# Patient Record
Sex: FEMALE | Race: WHITE | NOT HISPANIC OR LATINO | Employment: FULL TIME | URBAN - NONMETROPOLITAN AREA
[De-identification: names, ages, dates, MRNs, and addresses within clinical notes are randomized per-mention and may not be internally consistent; named-entity substitution may affect disease eponyms.]

---

## 2023-07-07 ENCOUNTER — OFFICE VISIT (OUTPATIENT)
Dept: PRIMARY CARE | Facility: CLINIC | Age: 53
End: 2023-07-07
Payer: COMMERCIAL

## 2023-07-07 VITALS
BODY MASS INDEX: 41.77 KG/M2 | WEIGHT: 227 LBS | HEIGHT: 62 IN | DIASTOLIC BLOOD PRESSURE: 64 MMHG | HEART RATE: 84 BPM | TEMPERATURE: 97.8 F | SYSTOLIC BLOOD PRESSURE: 124 MMHG | OXYGEN SATURATION: 98 %

## 2023-07-07 DIAGNOSIS — R73.9 HYPERGLYCEMIA: ICD-10-CM

## 2023-07-07 DIAGNOSIS — R09.A2 GLOBUS SENSATION: ICD-10-CM

## 2023-07-07 DIAGNOSIS — R63.5 EXCESSIVE WEIGHT GAIN: Primary | ICD-10-CM

## 2023-07-07 LAB
ALANINE AMINOTRANSFERASE (SGPT) (U/L) IN SER/PLAS: 13 U/L (ref 7–45)
ALBUMIN (G/DL) IN SER/PLAS: 4.2 G/DL (ref 3.4–5)
ALKALINE PHOSPHATASE (U/L) IN SER/PLAS: 72 U/L (ref 33–110)
ANION GAP IN SER/PLAS: 15 MMOL/L (ref 10–20)
ASPARTATE AMINOTRANSFERASE (SGOT) (U/L) IN SER/PLAS: 16 U/L (ref 9–39)
BASOPHILS (10*3/UL) IN BLOOD BY AUTOMATED COUNT: 0.06 X10E9/L (ref 0–0.1)
BASOPHILS/100 LEUKOCYTES IN BLOOD BY AUTOMATED COUNT: 0.9 % (ref 0–2)
BILIRUBIN TOTAL (MG/DL) IN SER/PLAS: 0.4 MG/DL (ref 0–1.2)
CALCIUM (MG/DL) IN SER/PLAS: 9.1 MG/DL (ref 8.6–10.3)
CARBON DIOXIDE, TOTAL (MMOL/L) IN SER/PLAS: 29 MMOL/L (ref 21–32)
CHLORIDE (MMOL/L) IN SER/PLAS: 100 MMOL/L (ref 98–107)
CREATININE (MG/DL) IN SER/PLAS: 0.72 MG/DL (ref 0.5–1.05)
EOSINOPHILS (10*3/UL) IN BLOOD BY AUTOMATED COUNT: 0.29 X10E9/L (ref 0–0.7)
EOSINOPHILS/100 LEUKOCYTES IN BLOOD BY AUTOMATED COUNT: 4.2 % (ref 0–6)
ERYTHROCYTE DISTRIBUTION WIDTH (RATIO) BY AUTOMATED COUNT: 13 % (ref 11.5–14.5)
ERYTHROCYTE MEAN CORPUSCULAR HEMOGLOBIN CONCENTRATION (G/DL) BY AUTOMATED: 29.9 G/DL (ref 32–36)
ERYTHROCYTE MEAN CORPUSCULAR VOLUME (FL) BY AUTOMATED COUNT: 98 FL (ref 80–100)
ERYTHROCYTES (10*6/UL) IN BLOOD BY AUTOMATED COUNT: 4.81 X10E12/L (ref 4–5.2)
GFR FEMALE: >90 ML/MIN/1.73M2
GLUCOSE (MG/DL) IN SER/PLAS: 85 MG/DL (ref 74–99)
HEMATOCRIT (%) IN BLOOD BY AUTOMATED COUNT: 47.2 % (ref 36–46)
HEMOGLOBIN (G/DL) IN BLOOD: 14.1 G/DL (ref 12–16)
IMMATURE GRANULOCYTES/100 LEUKOCYTES IN BLOOD BY AUTOMATED COUNT: 0.4 % (ref 0–0.9)
LEUKOCYTES (10*3/UL) IN BLOOD BY AUTOMATED COUNT: 6.8 X10E9/L (ref 4.4–11.3)
LYMPHOCYTES (10*3/UL) IN BLOOD BY AUTOMATED COUNT: 1.75 X10E9/L (ref 1.2–4.8)
LYMPHOCYTES/100 LEUKOCYTES IN BLOOD BY AUTOMATED COUNT: 25.6 % (ref 13–44)
MONOCYTES (10*3/UL) IN BLOOD BY AUTOMATED COUNT: 0.46 X10E9/L (ref 0.1–1)
MONOCYTES/100 LEUKOCYTES IN BLOOD BY AUTOMATED COUNT: 6.7 % (ref 2–10)
NEUTROPHILS (10*3/UL) IN BLOOD BY AUTOMATED COUNT: 4.24 X10E9/L (ref 1.2–7.7)
NEUTROPHILS/100 LEUKOCYTES IN BLOOD BY AUTOMATED COUNT: 62.2 % (ref 40–80)
PLATELETS (10*3/UL) IN BLOOD AUTOMATED COUNT: 331 X10E9/L (ref 150–450)
POTASSIUM (MMOL/L) IN SER/PLAS: 4.9 MMOL/L (ref 3.5–5.3)
PROTEIN TOTAL: 7.2 G/DL (ref 6.4–8.2)
SODIUM (MMOL/L) IN SER/PLAS: 139 MMOL/L (ref 136–145)
UREA NITROGEN (MG/DL) IN SER/PLAS: 15 MG/DL (ref 6–23)

## 2023-07-07 PROCEDURE — 99204 OFFICE O/P NEW MOD 45 MIN: CPT | Performed by: FAMILY MEDICINE

## 2023-07-07 PROCEDURE — 86376 MICROSOMAL ANTIBODY EACH: CPT

## 2023-07-07 PROCEDURE — 84481 FREE ASSAY (FT-3): CPT

## 2023-07-07 PROCEDURE — 85025 COMPLETE CBC W/AUTO DIFF WBC: CPT

## 2023-07-07 PROCEDURE — 84443 ASSAY THYROID STIM HORMONE: CPT

## 2023-07-07 PROCEDURE — 83036 HEMOGLOBIN GLYCOSYLATED A1C: CPT

## 2023-07-07 PROCEDURE — 80053 COMPREHEN METABOLIC PANEL: CPT

## 2023-07-07 PROCEDURE — 1036F TOBACCO NON-USER: CPT | Performed by: FAMILY MEDICINE

## 2023-07-07 RX ORDER — BUDESONIDE 180 UG/1
AEROSOL, POWDER RESPIRATORY (INHALATION)
COMMUNITY
Start: 2022-10-14 | End: 2023-10-16 | Stop reason: SDUPTHER

## 2023-07-07 RX ORDER — CYCLOBENZAPRINE HCL 10 MG
TABLET ORAL
COMMUNITY
Start: 2023-02-28 | End: 2024-06-03 | Stop reason: WASHOUT

## 2023-07-07 RX ORDER — ALBUTEROL SULFATE 90 UG/1
2 AEROSOL, METERED RESPIRATORY (INHALATION) EVERY 6 HOURS PRN
COMMUNITY
Start: 2023-02-28

## 2023-07-07 RX ORDER — OMEPRAZOLE 40 MG/1
CAPSULE, DELAYED RELEASE ORAL
COMMUNITY
End: 2024-03-28

## 2023-07-07 RX ORDER — IPRATROPIUM BROMIDE AND ALBUTEROL SULFATE 2.5; .5 MG/3ML; MG/3ML
SOLUTION RESPIRATORY (INHALATION)
COMMUNITY
Start: 2022-10-14

## 2023-07-07 RX ORDER — IBUPROFEN 200 MG
200 TABLET ORAL EVERY 6 HOURS PRN
COMMUNITY

## 2023-07-07 ASSESSMENT — ENCOUNTER SYMPTOMS
SLEEP DISTURBANCE: 0
ACTIVITY CHANGE: 0
ARTHRALGIAS: 0
SHORTNESS OF BREATH: 0
HEMATURIA: 0
DIARRHEA: 0
PALPITATIONS: 0
LIGHT-HEADEDNESS: 0
NAUSEA: 0
VOMITING: 0
APPETITE CHANGE: 0
SORE THROAT: 0
JOINT SWELLING: 0
NERVOUS/ANXIOUS: 0
FLANK PAIN: 0
WHEEZING: 0
FEVER: 0
EYE DISCHARGE: 0
ABDOMINAL DISTENTION: 1
EYE ITCHING: 0
NUMBNESS: 0
MYALGIAS: 0
UNEXPECTED WEIGHT CHANGE: 0
COUGH: 0
SINUS PRESSURE: 0
DYSPHORIC MOOD: 0
HEADACHES: 0
RHINORRHEA: 0
CONSTIPATION: 0
ABDOMINAL PAIN: 0
WEAKNESS: 0
BLOOD IN STOOL: 0
DYSURIA: 0
DIZZINESS: 0

## 2023-07-07 NOTE — PATIENT INSTRUCTIONS
Labs are taken and the thyroid usn is ordered   We will call with the results of the testing and further follow up

## 2023-07-07 NOTE — PROGRESS NOTES
"Subjective   Patient ID: Rivka Burgos is a 52 y.o. female who presents for Hernia (Multiple in the abdominal area according to Rivka.), Obesity (Concerned of gaining and not doing anything different. Tried Wegovy and it didn't help.  Adipex helped at one point. ), and Thyroid Problem (States at one time she was borderline.  ).    HPI REVIEWED PRIOR NOTES FROM OTHER PROVIDERS AND SEE REFERRAL IS MADE TO ENDOCRINE ?[facility]    Review of Systems   Constitutional:  Negative for activity change, appetite change, fever and unexpected weight change.   HENT:  Negative for congestion, ear pain, postnasal drip, rhinorrhea, sinus pressure and sore throat.    Eyes:  Negative for discharge, itching and visual disturbance.   Respiratory:  Negative for cough, shortness of breath and wheezing.    Cardiovascular:  Negative for chest pain, palpitations and leg swelling.   Gastrointestinal:  Positive for abdominal distention. Negative for abdominal pain, blood in stool, constipation, diarrhea, nausea and vomiting.        LARGE PROTUBERANT ABDOMEN  HAD EGD AND FOUND AN ULCER IN THE PAST   TOLD HAD ABDOMINAL HERNIAS    Endocrine: Positive for heat intolerance. Negative for cold intolerance and polyuria.        GLOBUS FEELING WHEN LIES DOWN AND FEELS LIKE CHOKING   RECENT SCOPE OK    Genitourinary:  Negative for dysuria, flank pain and hematuria.   Musculoskeletal:  Negative for arthralgias, gait problem, joint swelling and myalgias.   Skin:  Negative for rash.   Allergic/Immunologic: Negative for environmental allergies and food allergies.   Neurological:  Negative for dizziness, syncope, weakness, light-headedness, numbness and headaches.   Psychiatric/Behavioral:  Negative for dysphoric mood and sleep disturbance. The patient is not nervous/anxious.        Objective   /64   Pulse 84   Temp 36.6 °C (97.8 °F)   Ht 1.562 m (5' 1.5\")   Wt 103 kg (227 lb)   SpO2 98%   BMI 42.20 kg/m²     Physical Exam  Constitutional:       " Appearance: Normal appearance. She is obese.   HENT:      Head: Normocephalic and atraumatic.      Nose: Nose normal.      Mouth/Throat:      Mouth: Mucous membranes are moist.   Eyes:      Extraocular Movements: Extraocular movements intact.      Pupils: Pupils are equal, round, and reactive to light.   Neck:      Comments: THYROID FEEL ENLARGED AND RUBBERY   Cardiovascular:      Rate and Rhythm: Normal rate and regular rhythm.   Pulmonary:      Effort: Pulmonary effort is normal.      Breath sounds: Normal breath sounds.   Abdominal:      Palpations: Abdomen is soft.   Musculoskeletal:         General: Normal range of motion.      Cervical back: Normal range of motion and neck supple.   Skin:     General: Skin is warm and dry.   Neurological:      General: No focal deficit present.      Mental Status: She is alert and oriented to person, place, and time.   Psychiatric:         Mood and Affect: Mood normal.         Behavior: Behavior normal.         Assessment/Plan   Diagnoses and all orders for this visit:  Excessive weight gain  -     CBC and Auto Differential  -     Comprehensive Metabolic Panel  -     TSH with reflex to Free T4 if abnormal  -     Thyroid Peroxidase (TPO) Antibody  -     T3, free  -     US thyroid; Future  Globus sensation  -     CBC and Auto Differential  -     Comprehensive Metabolic Panel  -     TSH with reflex to Free T4 if abnormal  -     Thyroid Peroxidase (TPO) Antibody  -     T3, free  -     US thyroid; Future  Hyperglycemia  -     Hemoglobin A1c  Other orders  -     Follow Up In Primary Care - Established; Future

## 2023-07-08 LAB
ESTIMATED AVERAGE GLUCOSE FOR HBA1C: 111 MG/DL
HEMOGLOBIN A1C/HEMOGLOBIN TOTAL IN BLOOD: 5.5 %
THYROPEROXIDASE AB (IU/ML) IN SER/PLAS: 32 IU/ML
THYROTROPIN (MIU/L) IN SER/PLAS BY DETECTION LIMIT <= 0.05 MIU/L: 2.31 MIU/L (ref 0.44–3.98)
TRIIODOTHYRONINE (T3) FREE (PG/ML) IN SER/PLAS: 3.5 PG/ML (ref 2.3–4.2)

## 2023-07-10 ENCOUNTER — TELEPHONE (OUTPATIENT)
Dept: PRIMARY CARE | Facility: CLINIC | Age: 53
End: 2023-07-10
Payer: COMMERCIAL

## 2023-07-10 NOTE — TELEPHONE ENCOUNTER
----- Message from Bonnie Alvarez MA sent at 7/10/2023  1:29 PM EDT -----    ----- Message -----  From: Cydney Moffett DO  Sent: 7/10/2023   8:04 AM EDT  To: Bonnie Alvarez MA    USN OF HER THYROID IS PENDING  [facility] LOW PLEASE PUT IN FOR IRON AND TIBC   REST OF LABS INCLUDING HER THYROID ARE ALL NORMAL

## 2023-07-12 NOTE — TELEPHONE ENCOUNTER
Does not look like they were able to do additional labs. Do you want her to come back in and have drawn?

## 2023-07-17 ENCOUNTER — OFFICE VISIT (OUTPATIENT)
Dept: PRIMARY CARE | Facility: CLINIC | Age: 53
End: 2023-07-17
Payer: COMMERCIAL

## 2023-07-17 VITALS
DIASTOLIC BLOOD PRESSURE: 60 MMHG | WEIGHT: 227 LBS | BODY MASS INDEX: 42.2 KG/M2 | TEMPERATURE: 97.6 F | SYSTOLIC BLOOD PRESSURE: 122 MMHG | OXYGEN SATURATION: 96 % | HEART RATE: 93 BPM

## 2023-07-17 DIAGNOSIS — R63.5 EXCESSIVE WEIGHT GAIN: ICD-10-CM

## 2023-07-17 DIAGNOSIS — G47.33 OSA ON CPAP: ICD-10-CM

## 2023-07-17 DIAGNOSIS — R09.A2 GLOBUS SENSATION: Primary | ICD-10-CM

## 2023-07-17 DIAGNOSIS — F41.9 ANXIETY: ICD-10-CM

## 2023-07-17 DIAGNOSIS — Z02.89 MEDICATION MANAGEMENT CONTRACT AGREEMENT: ICD-10-CM

## 2023-07-17 PROCEDURE — 99214 OFFICE O/P EST MOD 30 MIN: CPT | Performed by: FAMILY MEDICINE

## 2023-07-17 PROCEDURE — 1036F TOBACCO NON-USER: CPT | Performed by: FAMILY MEDICINE

## 2023-07-17 PROCEDURE — 80307 DRUG TEST PRSMV CHEM ANLYZR: CPT

## 2023-07-17 RX ORDER — BUSPIRONE HYDROCHLORIDE 5 MG/1
5 TABLET ORAL NIGHTLY
Qty: 30 TABLET | Refills: 2 | Status: SHIPPED | OUTPATIENT
Start: 2023-07-17 | End: 2023-08-09

## 2023-07-17 ASSESSMENT — ENCOUNTER SYMPTOMS
NAUSEA: 0
ABDOMINAL PAIN: 0
DYSURIA: 0
RHINORRHEA: 0
PALPITATIONS: 0
LIGHT-HEADEDNESS: 0
NERVOUS/ANXIOUS: 0
FEVER: 0
COUGH: 0
DIARRHEA: 0
SINUS PRESSURE: 0
UNEXPECTED WEIGHT CHANGE: 0
SORE THROAT: 0
HEADACHES: 0
HEMATURIA: 0
EYE ITCHING: 0
CONSTIPATION: 0
JOINT SWELLING: 0
DYSPHORIC MOOD: 1
EYE DISCHARGE: 0
MYALGIAS: 0
NUMBNESS: 0
ARTHRALGIAS: 0
SLEEP DISTURBANCE: 1
WHEEZING: 0
APPETITE CHANGE: 0
DIZZINESS: 0
WEAKNESS: 0
FATIGUE: 1
BLOOD IN STOOL: 0
FLANK PAIN: 0
VOMITING: 0
SHORTNESS OF BREATH: 0
ACTIVITY CHANGE: 0

## 2023-07-17 NOTE — PATIENT INSTRUCTIONS
SEE IN 3 MONTHS CMP AND IRON AND TIBC  AND FERRITIN LEVEL  CBC   TSH AND T4     SEE IN ONE MONTH FOR THE WEIGHT LOSS PROGRAM

## 2023-07-17 NOTE — TELEPHONE ENCOUNTER
LOOKS LIKE SHE HAS CX HER FOLLOW UP  Rx: Puree and thin liquids:  meds crushed in puree: NO STRAW  Pt upright and in alignment for meals.  Stay upright 20-30 minutes after eating.   small sips of thin liquid.  feed as tolerated and only if pt is able to participate.   Disc with nsg.  Service will follow for tolerance and upgrade.

## 2023-07-17 NOTE — PROGRESS NOTES
Subjective   Patient ID: Rivka Burgos is a 52 y.o. female who presents for Follow-up.    HPI LOTS OF FAMILY STRESS     Review of Systems   Constitutional:  Positive for fatigue. Negative for activity change, appetite change, fever and unexpected weight change.   HENT:  Negative for congestion, ear pain, postnasal drip, rhinorrhea, sinus pressure and sore throat.    Eyes:  Negative for discharge, itching and visual disturbance.   Respiratory:  Negative for cough, shortness of breath and wheezing.    Cardiovascular:  Negative for chest pain, palpitations and leg swelling.   Gastrointestinal:  Negative for abdominal pain, blood in stool, constipation, diarrhea, nausea and vomiting.   Endocrine: Negative for cold intolerance, heat intolerance and polyuria.   Genitourinary:  Negative for dysuria, flank pain and hematuria.   Musculoskeletal:  Negative for arthralgias, gait problem, joint swelling and myalgias.   Skin:  Negative for rash.   Allergic/Immunologic: Negative for environmental allergies and food allergies.   Neurological:  Negative for dizziness, syncope, weakness, light-headedness, numbness and headaches.   Psychiatric/Behavioral:  Positive for dysphoric mood and sleep disturbance. The patient is not nervous/anxious.         FALLS ASLEEP BUT CAN NOT MAINTAIN SLEEP /WAKES UP WORRIED OVER DAUGHTER'S AND GRANDCHILDREN AND BOTH PARENTS WHOA RE GETTING OLDER AND WITH HEALTH PROBLEMS   SHE DOES NOT FEEL WELL BEING SO OVERWEIGHT        Objective   /60   Pulse 93   Temp 36.4 °C (97.6 °F)   Wt 103 kg (227 lb)   SpO2 96%   BMI 42.20 kg/m²     Physical Exam  Constitutional:       Appearance: Normal appearance. She is obese.   HENT:      Head: Normocephalic and atraumatic.      Nose: Nose normal.      Mouth/Throat:      Mouth: Mucous membranes are moist.   Eyes:      Extraocular Movements: Extraocular movements intact.      Pupils: Pupils are equal, round, and reactive to light.   Cardiovascular:      Rate  "and Rhythm: Normal rate and regular rhythm.   Pulmonary:      Effort: Pulmonary effort is normal.      Breath sounds: Normal breath sounds.   Abdominal:      Palpations: Abdomen is soft.   Musculoskeletal:         General: Normal range of motion.      Cervical back: Normal range of motion and neck supple.   Skin:     General: Skin is warm and dry.   Neurological:      General: No focal deficit present.      Mental Status: She is alert and oriented to person, place, and time.   Psychiatric:         Behavior: Behavior normal.      Comments: ANXIOUS MOOD AND SOME FEELINGS OF DEPRESSION OR FRUSTRATION OVER ISSUES WITH HER FAMILY  \"NO TIME TO HELP HERSELF\"         Assessment/Plan   Diagnoses and all orders for this visit:  Globus sensation  -     busPIRone (Buspar) 5 mg tablet; Take 1 tablet (5 mg) by mouth once daily at bedtime.  LARA on CPAP  Anxiety  -     busPIRone (Buspar) 5 mg tablet; Take 1 tablet (5 mg) by mouth once daily at bedtime.  Medication management contract agreement  -     Drug Screen, Urine With Reflex to Confirmation  Excessive weight gain  Other orders  -     Follow Up In Primary Care - Established         "

## 2023-07-18 ENCOUNTER — TELEPHONE (OUTPATIENT)
Dept: PRIMARY CARE | Facility: CLINIC | Age: 53
End: 2023-07-18
Payer: COMMERCIAL

## 2023-07-18 DIAGNOSIS — E66.01 CLASS 3 SEVERE OBESITY DUE TO EXCESS CALORIES WITH BODY MASS INDEX (BMI) OF 40.0 TO 44.9 IN ADULT, UNSPECIFIED WHETHER SERIOUS COMORBIDITY PRESENT (MULTI): Primary | ICD-10-CM

## 2023-07-18 PROBLEM — E66.813 CLASS 3 SEVERE OBESITY DUE TO EXCESS CALORIES WITH BODY MASS INDEX (BMI) OF 40.0 TO 44.9 IN ADULT: Status: ACTIVE | Noted: 2023-07-18

## 2023-07-18 LAB
AMPHETAMINE (PRESENCE) IN URINE BY SCREEN METHOD: NORMAL
BARBITURATES PRESENCE IN URINE BY SCREEN METHOD: NORMAL
BENZODIAZEPINE (PRESENCE) IN URINE BY SCREEN METHOD: NORMAL
CANNABINOIDS IN URINE BY SCREEN METHOD: NORMAL
COCAINE (PRESENCE) IN URINE BY SCREEN METHOD: NORMAL
DRUG SCREEN COMMENT URINE: NORMAL
FENTANYL URINE: NORMAL
METHADONE (PRESENCE) IN URINE BY SCREEN METHOD: NORMAL
OPIATES (PRESENCE) IN URINE BY SCREEN METHOD: NORMAL
OXYCODONE (PRESENCE) IN URINE BY SCREEN METHOD: NORMAL
PHENCYCLIDINE (PRESENCE) IN URINE BY SCREEN METHOD: NORMAL

## 2023-07-18 RX ORDER — PHENTERMINE HYDROCHLORIDE 37.5 MG/1
37.5 TABLET ORAL
Qty: 30 TABLET | Refills: 0 | Status: SHIPPED | OUTPATIENT
Start: 2023-07-18 | End: 2023-08-14 | Stop reason: SDUPTHER

## 2023-07-18 NOTE — TELEPHONE ENCOUNTER
----- Message from Cydney Moffett DO sent at 7/18/2023  8:52 AM EDT -----  Sent adipcheko  See one month

## 2023-07-18 NOTE — TELEPHONE ENCOUNTER
"----- Message from Cydney Moffett DO sent at 7/18/2023  8:44 AM EDT -----  HER UDS IS NEGATIVE FOR ILLICIT\"S   I WILL SEND ADIPEX  SEE IN ONE MONTH FOR WEIGHT FOLLOW UP    "

## 2023-07-18 NOTE — TELEPHONE ENCOUNTER
I left a message that her script was sent to her pharmacy.  No specific info was given on the VM.

## 2023-08-08 DIAGNOSIS — R09.A2 GLOBUS SENSATION: ICD-10-CM

## 2023-08-08 DIAGNOSIS — F41.9 ANXIETY: ICD-10-CM

## 2023-08-09 RX ORDER — BUSPIRONE HYDROCHLORIDE 5 MG/1
5 TABLET ORAL NIGHTLY
Qty: 30 TABLET | Refills: 2 | Status: SHIPPED | OUTPATIENT
Start: 2023-08-09 | End: 2023-09-11 | Stop reason: SDUPTHER

## 2023-08-14 ENCOUNTER — OFFICE VISIT (OUTPATIENT)
Dept: PRIMARY CARE | Facility: CLINIC | Age: 53
End: 2023-08-14
Payer: COMMERCIAL

## 2023-08-14 VITALS
DIASTOLIC BLOOD PRESSURE: 68 MMHG | SYSTOLIC BLOOD PRESSURE: 116 MMHG | OXYGEN SATURATION: 95 % | BODY MASS INDEX: 41.45 KG/M2 | HEART RATE: 91 BPM | TEMPERATURE: 98.5 F | WEIGHT: 223 LBS

## 2023-08-14 DIAGNOSIS — R09.A2 GLOBUS SENSATION: ICD-10-CM

## 2023-08-14 DIAGNOSIS — Z02.89 MEDICATION MANAGEMENT CONTRACT AGREEMENT: Primary | ICD-10-CM

## 2023-08-14 DIAGNOSIS — R63.5 EXCESSIVE WEIGHT GAIN: ICD-10-CM

## 2023-08-14 DIAGNOSIS — E66.01 CLASS 3 SEVERE OBESITY DUE TO EXCESS CALORIES WITH BODY MASS INDEX (BMI) OF 40.0 TO 44.9 IN ADULT, UNSPECIFIED WHETHER SERIOUS COMORBIDITY PRESENT (MULTI): ICD-10-CM

## 2023-08-14 PROCEDURE — 1036F TOBACCO NON-USER: CPT | Performed by: FAMILY MEDICINE

## 2023-08-14 PROCEDURE — 99214 OFFICE O/P EST MOD 30 MIN: CPT | Performed by: FAMILY MEDICINE

## 2023-08-14 PROCEDURE — 3008F BODY MASS INDEX DOCD: CPT | Performed by: FAMILY MEDICINE

## 2023-08-14 RX ORDER — PHENTERMINE HYDROCHLORIDE 37.5 MG/1
37.5 TABLET ORAL
Qty: 30 TABLET | Refills: 0 | Status: SHIPPED | OUTPATIENT
Start: 2023-08-14 | End: 2023-09-11 | Stop reason: SDUPTHER

## 2023-08-14 ASSESSMENT — ENCOUNTER SYMPTOMS
ABDOMINAL PAIN: 0
JOINT SWELLING: 0
FLANK PAIN: 0
EYE ITCHING: 0
DYSPHORIC MOOD: 0
WHEEZING: 0
NERVOUS/ANXIOUS: 0
SORE THROAT: 0
APPETITE CHANGE: 0
SLEEP DISTURBANCE: 0
PALPITATIONS: 0
UNEXPECTED WEIGHT CHANGE: 0
HEMATURIA: 0
CONSTIPATION: 0
SINUS PRESSURE: 0
ACTIVITY CHANGE: 0
BLOOD IN STOOL: 0
ARTHRALGIAS: 0
MYALGIAS: 0
HEADACHES: 0
LIGHT-HEADEDNESS: 0
EYE DISCHARGE: 0
VOMITING: 0
FEVER: 0
SHORTNESS OF BREATH: 0
DIZZINESS: 0
DIARRHEA: 0
COUGH: 0
WEAKNESS: 0
RHINORRHEA: 0
NUMBNESS: 0
NAUSEA: 0
DYSURIA: 0

## 2023-08-14 NOTE — PROGRESS NOTES
Subjective   Patient ID: Rivka Burgos is a 52 y.o. female who presents for Weight Check (227 to 223).    HPI has lost some weight 4 pounds   This is giving h er more energy and she feels less full through her neck area     Review of Systems   Constitutional:  Negative for activity change, appetite change, fever and unexpected weight change.   HENT:  Negative for congestion, ear pain, postnasal drip, rhinorrhea, sinus pressure and sore throat.    Eyes:  Negative for discharge, itching and visual disturbance.   Respiratory:  Negative for cough, shortness of breath and wheezing.    Cardiovascular:  Negative for chest pain, palpitations and leg swelling.   Gastrointestinal:  Negative for abdominal pain, blood in stool, constipation, diarrhea, nausea and vomiting.   Endocrine: Negative for cold intolerance, heat intolerance and polyuria.   Genitourinary:  Negative for dysuria, flank pain and hematuria.   Musculoskeletal:  Negative for arthralgias, gait problem, joint swelling and myalgias.   Skin:  Negative for rash.   Allergic/Immunologic: Negative for environmental allergies and food allergies.   Neurological:  Negative for dizziness, syncope, weakness, light-headedness, numbness and headaches.   Psychiatric/Behavioral:  Negative for dysphoric mood and sleep disturbance. The patient is not nervous/anxious.        Objective   /68   Pulse 91   Temp 36.9 °C (98.5 °F)   Wt 101 kg (223 lb)   SpO2 95%   BMI 41.45 kg/m²     Physical Exam  Constitutional:       Appearance: Normal appearance.   HENT:      Head: Normocephalic and atraumatic.      Nose: Nose normal.      Mouth/Throat:      Mouth: Mucous membranes are moist.   Eyes:      Extraocular Movements: Extraocular movements intact.      Pupils: Pupils are equal, round, and reactive to light.   Neck:      Comments: Less fullness and less of a globus feeling   Thyroid was evaluated and was normal   Cardiovascular:      Rate and Rhythm: Normal rate and regular  rhythm.   Pulmonary:      Effort: Pulmonary effort is normal.      Breath sounds: Normal breath sounds.   Abdominal:      Palpations: Abdomen is soft.   Musculoskeletal:         General: Normal range of motion.      Cervical back: Normal range of motion and neck supple.   Skin:     General: Skin is warm and dry.   Neurological:      General: No focal deficit present.      Mental Status: She is alert and oriented to person, place, and time.   Psychiatric:         Mood and Affect: Mood normal.         Behavior: Behavior normal.         Assessment/Plan   Diagnoses and all orders for this visit:  Medication management contract agreement  -     phentermine (Adipex-P) 37.5 mg tablet; Take 1 tablet (37.5 mg) by mouth once daily in the morning. Take before meals.  Excessive weight gain  -     phentermine (Adipex-P) 37.5 mg tablet; Take 1 tablet (37.5 mg) by mouth once daily in the morning. Take before meals.  Globus sensation  -     phentermine (Adipex-P) 37.5 mg tablet; Take 1 tablet (37.5 mg) by mouth once daily in the morning. Take before meals.  Class 3 severe obesity due to excess calories with body mass index (BMI) of 40.0 to 44.9 in adult, unspecified whether serious comorbidity present (CMS/HCC)  -     phentermine (Adipex-P) 37.5 mg tablet; Take 1 tablet (37.5 mg) by mouth once daily in the morning. Take before meals.

## 2023-09-11 ENCOUNTER — OFFICE VISIT (OUTPATIENT)
Dept: PRIMARY CARE | Facility: CLINIC | Age: 53
End: 2023-09-11
Payer: COMMERCIAL

## 2023-09-11 VITALS
DIASTOLIC BLOOD PRESSURE: 80 MMHG | WEIGHT: 219 LBS | SYSTOLIC BLOOD PRESSURE: 120 MMHG | OXYGEN SATURATION: 97 % | TEMPERATURE: 96.9 F | BODY MASS INDEX: 40.71 KG/M2 | HEART RATE: 111 BPM

## 2023-09-11 DIAGNOSIS — R63.5 EXCESSIVE WEIGHT GAIN: ICD-10-CM

## 2023-09-11 DIAGNOSIS — Z02.89 MEDICATION MANAGEMENT CONTRACT AGREEMENT: ICD-10-CM

## 2023-09-11 DIAGNOSIS — E66.01 CLASS 3 SEVERE OBESITY DUE TO EXCESS CALORIES WITH BODY MASS INDEX (BMI) OF 40.0 TO 44.9 IN ADULT, UNSPECIFIED WHETHER SERIOUS COMORBIDITY PRESENT (MULTI): ICD-10-CM

## 2023-09-11 DIAGNOSIS — F41.9 ANXIETY: ICD-10-CM

## 2023-09-11 DIAGNOSIS — R09.A2 GLOBUS SENSATION: ICD-10-CM

## 2023-09-11 PROCEDURE — 99213 OFFICE O/P EST LOW 20 MIN: CPT | Performed by: FAMILY MEDICINE

## 2023-09-11 PROCEDURE — 1036F TOBACCO NON-USER: CPT | Performed by: FAMILY MEDICINE

## 2023-09-11 PROCEDURE — 3008F BODY MASS INDEX DOCD: CPT | Performed by: FAMILY MEDICINE

## 2023-09-11 RX ORDER — BUSPIRONE HYDROCHLORIDE 10 MG/1
10 TABLET ORAL NIGHTLY
Qty: 90 TABLET | Refills: 3 | Status: SHIPPED | OUTPATIENT
Start: 2023-09-11 | End: 2024-09-10

## 2023-09-11 RX ORDER — PHENTERMINE HYDROCHLORIDE 37.5 MG/1
37.5 TABLET ORAL
Qty: 30 TABLET | Refills: 0 | Status: SHIPPED | OUTPATIENT
Start: 2023-09-11 | End: 2023-10-16 | Stop reason: ALTCHOICE

## 2023-09-11 ASSESSMENT — ENCOUNTER SYMPTOMS
BLOOD IN STOOL: 0
SORE THROAT: 0
APPETITE CHANGE: 0
DYSURIA: 0
DYSPHORIC MOOD: 0
NERVOUS/ANXIOUS: 0
ACTIVITY CHANGE: 0
WHEEZING: 0
VOMITING: 0
RHINORRHEA: 0
UNEXPECTED WEIGHT CHANGE: 0
EYE ITCHING: 0
ABDOMINAL PAIN: 0
NUMBNESS: 0
FLANK PAIN: 0
WEAKNESS: 0
NAUSEA: 0
COUGH: 0
JOINT SWELLING: 0
HEMATURIA: 0
HEADACHES: 0
DIARRHEA: 0
ARTHRALGIAS: 0
MYALGIAS: 0
CONSTIPATION: 0
PALPITATIONS: 0
FEVER: 0
SLEEP DISTURBANCE: 0
EYE DISCHARGE: 0
LIGHT-HEADEDNESS: 0
DIZZINESS: 0
SHORTNESS OF BREATH: 0
SINUS PRESSURE: 0

## 2023-09-11 NOTE — PROGRESS NOTES
Subjective   Patient ID: Rivka Burgos is a 52 y.o. female who presents for Weight Check (Rivka is here for her weight check. She is doing good. ).    HPI total 8 pounds weight loss  Increase activity   Modifying carbs and fats     Review of Systems   Constitutional:  Negative for activity change, appetite change, fever and unexpected weight change.   HENT:  Negative for congestion, ear pain, postnasal drip, rhinorrhea, sinus pressure and sore throat.    Eyes:  Negative for discharge, itching and visual disturbance.   Respiratory:  Negative for cough, shortness of breath and wheezing.    Cardiovascular:  Negative for chest pain, palpitations and leg swelling.   Gastrointestinal:  Negative for abdominal pain, blood in stool, constipation, diarrhea, nausea and vomiting.   Endocrine: Negative for cold intolerance, heat intolerance and polyuria.   Genitourinary:  Negative for dysuria, flank pain and hematuria.   Musculoskeletal:  Negative for arthralgias, gait problem, joint swelling and myalgias.   Skin:  Negative for rash.   Allergic/Immunologic: Negative for environmental allergies and food allergies.   Neurological:  Negative for dizziness, syncope, weakness, light-headedness, numbness and headaches.   Psychiatric/Behavioral:  Negative for dysphoric mood and sleep disturbance. The patient is not nervous/anxious.        Objective   /80   Pulse (!) 111   Temp 36.1 °C (96.9 °F)   Wt 99.3 kg (219 lb)   SpO2 97%   BMI 40.71 kg/m²     Physical Exam  Constitutional:       Appearance: Normal appearance. She is obese.   HENT:      Head: Normocephalic and atraumatic.      Nose: Nose normal.      Mouth/Throat:      Mouth: Mucous membranes are moist.   Eyes:      Extraocular Movements: Extraocular movements intact.      Pupils: Pupils are equal, round, and reactive to light.   Cardiovascular:      Rate and Rhythm: Normal rate and regular rhythm.   Pulmonary:      Effort: Pulmonary effort is normal.      Breath  sounds: Normal breath sounds.   Abdominal:      Palpations: Abdomen is soft.   Musculoskeletal:         General: Normal range of motion.      Cervical back: Normal range of motion and neck supple.   Skin:     General: Skin is warm and dry.   Neurological:      General: No focal deficit present.      Mental Status: She is alert and oriented to person, place, and time.   Psychiatric:         Mood and Affect: Mood normal.         Behavior: Behavior normal.      Comments: ANXIOUS AND DEPRESSED AND DOING BETTER WITH INCREASE OF THE BUSPAR TO 10 MGS AND SHE NEEDS A NEW SCRIPT SENT          Assessment/Plan   Diagnoses and all orders for this visit:  Medication management contract agreement  -     phentermine (Adipex-P) 37.5 mg tablet; Take 1 tablet (37.5 mg) by mouth once daily in the morning. Take before meals.  Excessive weight gain  -     phentermine (Adipex-P) 37.5 mg tablet; Take 1 tablet (37.5 mg) by mouth once daily in the morning. Take before meals.  Globus sensation  -     phentermine (Adipex-P) 37.5 mg tablet; Take 1 tablet (37.5 mg) by mouth once daily in the morning. Take before meals.  -     busPIRone (Buspar) 10 mg tablet; Take 1 tablet (10 mg) by mouth once daily at bedtime.  Class 3 severe obesity due to excess calories with body mass index (BMI) of 40.0 to 44.9 in adult, unspecified whether serious comorbidity present (CMS/HCC)  -     phentermine (Adipex-P) 37.5 mg tablet; Take 1 tablet (37.5 mg) by mouth once daily in the morning. Take before meals.  Anxiety  -     busPIRone (Buspar) 10 mg tablet; Take 1 tablet (10 mg) by mouth once daily at bedtime.  Other orders  -     Follow Up In Primary Care - Established; Future

## 2023-10-16 ENCOUNTER — OFFICE VISIT (OUTPATIENT)
Dept: PRIMARY CARE | Facility: CLINIC | Age: 53
End: 2023-10-16
Payer: COMMERCIAL

## 2023-10-16 VITALS
HEART RATE: 107 BPM | TEMPERATURE: 98.5 F | BODY MASS INDEX: 40.15 KG/M2 | OXYGEN SATURATION: 97 % | DIASTOLIC BLOOD PRESSURE: 68 MMHG | WEIGHT: 216 LBS | SYSTOLIC BLOOD PRESSURE: 110 MMHG

## 2023-10-16 DIAGNOSIS — G47.33 OSA ON CPAP: Primary | ICD-10-CM

## 2023-10-16 DIAGNOSIS — J45.20 MILD INTERMITTENT ASTHMA, UNSPECIFIED WHETHER COMPLICATED (HHS-HCC): ICD-10-CM

## 2023-10-16 DIAGNOSIS — F41.9 ANXIETY: ICD-10-CM

## 2023-10-16 PROCEDURE — 1036F TOBACCO NON-USER: CPT | Performed by: FAMILY MEDICINE

## 2023-10-16 PROCEDURE — 3008F BODY MASS INDEX DOCD: CPT | Performed by: FAMILY MEDICINE

## 2023-10-16 PROCEDURE — 99213 OFFICE O/P EST LOW 20 MIN: CPT | Performed by: FAMILY MEDICINE

## 2023-10-16 PROCEDURE — 96127 BRIEF EMOTIONAL/BEHAV ASSMT: CPT | Performed by: FAMILY MEDICINE

## 2023-10-16 RX ORDER — BUDESONIDE 180 UG/1
AEROSOL, POWDER RESPIRATORY (INHALATION)
Qty: 1 EACH | Refills: 3 | Status: SHIPPED | OUTPATIENT
Start: 2023-10-16

## 2023-10-16 ASSESSMENT — ENCOUNTER SYMPTOMS
RHINORRHEA: 0
SORE THROAT: 0
BLOOD IN STOOL: 0
LIGHT-HEADEDNESS: 0
COUGH: 1
MYALGIAS: 0
DIARRHEA: 0
HEADACHES: 0
CONSTIPATION: 0
DYSURIA: 0
ARTHRALGIAS: 0
SINUS PRESSURE: 0
CHEST TIGHTNESS: 1
DIZZINESS: 0
ABDOMINAL PAIN: 0
JOINT SWELLING: 0
ACTIVITY CHANGE: 0
FLANK PAIN: 0
NUMBNESS: 0
NAUSEA: 0
FEVER: 0
EYE ITCHING: 0
PALPITATIONS: 0
HEMATURIA: 0
SLEEP DISTURBANCE: 0
DYSPHORIC MOOD: 0
VOMITING: 0
UNEXPECTED WEIGHT CHANGE: 0
SHORTNESS OF BREATH: 1
WHEEZING: 1
WEAKNESS: 0
APPETITE CHANGE: 0
EYE DISCHARGE: 0
NERVOUS/ANXIOUS: 1

## 2023-10-16 NOTE — PROGRESS NOTES
Subjective   Patient ID: Rivka Burgos is a 52 y.o. female who presents for Med Refill (PULMACORT).    HPI ONLY LOST 3 POUNDS AND SAYS WALKS DAILY no further phentermine   Her asthma when she gets hot or laughs or with breathing cold  air is helped with the pulmicort and she needs it refilled   Review of Systems   Constitutional:  Negative for activity change, appetite change, fever and unexpected weight change.   HENT:  Negative for congestion, ear pain, postnasal drip, rhinorrhea, sinus pressure and sore throat.    Eyes:  Negative for discharge, itching and visual disturbance.   Respiratory:  Positive for cough, chest tightness, shortness of breath and wheezing.         Mild and only certain times    Cardiovascular:  Negative for chest pain, palpitations and leg swelling.   Gastrointestinal:  Negative for abdominal pain, blood in stool, constipation, diarrhea, nausea and vomiting.   Endocrine: Negative for cold intolerance, heat intolerance and polyuria.   Genitourinary:  Negative for dysuria, flank pain and hematuria.   Musculoskeletal:  Negative for arthralgias, gait problem, joint swelling and myalgias.   Skin:  Negative for rash.   Allergic/Immunologic: Negative for environmental allergies and food allergies.   Neurological:  Negative for dizziness, syncope, weakness, light-headedness, numbness and headaches.   Psychiatric/Behavioral:  Negative for dysphoric mood and sleep disturbance. The patient is nervous/anxious.        Objective   /68   Pulse 107   Temp 36.9 °C (98.5 °F)   Wt 98 kg (216 lb)   SpO2 97%   BMI 40.15 kg/m²     Physical ExamSINCE RECENT VISIT NO INTERVAL PHYSICAL CHANGES IN ANY OF THE 12 SYSTEMS REVIEWED OTHER THAN with her anxiety that is better with a edwin score of 8     Assessment/Plan   Diagnoses and all orders for this visit:  LARA on CPAP  Anxiety  Mild intermittent asthma, unspecified whether complicated  -     Pulmicort Flexhaler 180 mcg/actuation inhaler; USE DAILY WITH  SOB

## 2024-03-28 DIAGNOSIS — K21.00 GASTRO-ESOPHAGEAL REFLUX DISEASE WITH ESOPHAGITIS, WITHOUT BLEEDING: ICD-10-CM

## 2024-03-28 RX ORDER — OMEPRAZOLE 40 MG/1
CAPSULE, DELAYED RELEASE ORAL
Qty: 90 CAPSULE | Refills: 1 | Status: SHIPPED | OUTPATIENT
Start: 2024-03-28

## 2024-05-29 ENCOUNTER — TELEPHONE (OUTPATIENT)
Dept: PRIMARY CARE | Facility: CLINIC | Age: 54
End: 2024-05-29
Payer: COMMERCIAL

## 2024-05-29 NOTE — TELEPHONE ENCOUNTER
ER Location: [facility]  Date of service: 5/28/24  Diagnosis: ANKLE INJURY  Medications Prescribed: 600 MG IBUPROFEN  Scheduled Follow Up: Yes- 6/3/24

## 2024-06-03 ENCOUNTER — OFFICE VISIT (OUTPATIENT)
Dept: PRIMARY CARE | Facility: CLINIC | Age: 54
End: 2024-06-03
Payer: COMMERCIAL

## 2024-06-03 VITALS
BODY MASS INDEX: 43.13 KG/M2 | OXYGEN SATURATION: 95 % | DIASTOLIC BLOOD PRESSURE: 70 MMHG | HEART RATE: 121 BPM | TEMPERATURE: 98.9 F | SYSTOLIC BLOOD PRESSURE: 130 MMHG | WEIGHT: 232 LBS

## 2024-06-03 DIAGNOSIS — S93.401D SPRAIN OF RIGHT ANKLE, UNSPECIFIED LIGAMENT, SUBSEQUENT ENCOUNTER: Primary | ICD-10-CM

## 2024-06-03 PROCEDURE — 3008F BODY MASS INDEX DOCD: CPT | Performed by: FAMILY MEDICINE

## 2024-06-03 PROCEDURE — 99214 OFFICE O/P EST MOD 30 MIN: CPT | Performed by: FAMILY MEDICINE

## 2024-06-03 NOTE — PROGRESS NOTES
Subjective   Patient ID: Rivka Burgos is a 53 y.o. female who presents for Hospital Follow-up ([facility]-xrays and air cast-wrapped and currently on crutches. /No current concerns) and Obesity (Would like to possibly see a bariatric clinic.).    HPI REVIEWED ER RECORD /NO FRACTURE     Review of Systems   Constitutional:  Positive for activity change and unexpected weight change. Negative for appetite change and fever.   HENT:  Negative for congestion, ear pain, postnasal drip, rhinorrhea, sinus pressure and sore throat.    Eyes:  Negative for discharge, itching and visual disturbance.   Respiratory:  Negative for cough, shortness of breath and wheezing.         COPD /ASTHMA    Cardiovascular:  Negative for chest pain, palpitations and leg swelling.   Gastrointestinal:  Negative for abdominal pain, blood in stool, constipation, diarrhea, nausea and vomiting.   Endocrine: Negative for cold intolerance, heat intolerance and polyuria.   Genitourinary:  Negative for dysuria, flank pain and hematuria.   Musculoskeletal:  Positive for arthralgias. Negative for gait problem (SPRAIN ANKLE /CRUTCHES), joint swelling and myalgias.   Skin:  Positive for rash.        ITCHING FLAKING FOR SOME TIMES HANDS    Allergic/Immunologic: Negative for environmental allergies and food allergies.   Neurological:  Negative for dizziness, syncope, weakness, light-headedness, numbness and headaches.   Psychiatric/Behavioral:  Negative for dysphoric mood and sleep disturbance. The patient is not nervous/anxious.        Objective   /70   Pulse (!) 121   Temp 37.2 °C (98.9 °F)   Wt 105 kg (232 lb)   SpO2 95%   BMI 43.13 kg/m²     Physical Exam  Vitals and nursing note reviewed.   Constitutional:       Appearance: Normal appearance. She is obese.   HENT:      Head: Normocephalic.      Mouth/Throat:      Mouth: Mucous membranes are moist.   Cardiovascular:      Rate and Rhythm: Normal rate and regular rhythm.      Pulses: Normal pulses.       Heart sounds: Normal heart sounds. No murmur heard.     No friction rub. No gallop.   Pulmonary:      Effort: Pulmonary effort is normal. No respiratory distress.      Breath sounds: Normal breath sounds. No wheezing.   Abdominal:      General: Bowel sounds are normal. There is no distension.      Palpations: Abdomen is soft.      Tenderness: There is no abdominal tenderness.   Musculoskeletal:         General: Signs of injury present. No deformity.      Comments: ANKLE IN VELCO BRACE /CRUTCHES    Skin:     General: Skin is warm and dry.      Capillary Refill: Capillary refill takes less than 2 seconds.      Findings: Rash present.   Neurological:      General: No focal deficit present.      Mental Status: She is alert and oriented to person, place, and time.   Psychiatric:         Mood and Affect: Mood normal.         Assessment/Plan   Problem List Items Addressed This Visit    None  Visit Diagnoses         Codes    Sprain of right ankle, unspecified ligament, subsequent encounter    -  Primary S93.401D

## 2024-06-04 ENCOUNTER — TELEPHONE (OUTPATIENT)
Dept: PRIMARY CARE | Facility: CLINIC | Age: 54
End: 2024-06-04
Payer: COMMERCIAL

## 2024-06-04 NOTE — TELEPHONE ENCOUNTER
Rivka called because she was told you were calling in a cream for her feet and putting in a referral for bariatric center.

## 2024-06-05 DIAGNOSIS — L30.1 DYSHYDROSIS: ICD-10-CM

## 2024-06-05 DIAGNOSIS — E66.01 CLASS 3 SEVERE OBESITY DUE TO EXCESS CALORIES WITH SERIOUS COMORBIDITY AND BODY MASS INDEX (BMI) OF 40.0 TO 44.9 IN ADULT (MULTI): Primary | ICD-10-CM

## 2024-06-05 RX ORDER — AMMONIUM LACTATE 12 G/100G
LOTION TOPICAL AS NEEDED
Qty: 225 G | Refills: 1 | Status: SHIPPED | OUTPATIENT
Start: 2024-06-05 | End: 2025-06-05

## 2024-06-05 ASSESSMENT — ENCOUNTER SYMPTOMS
NERVOUS/ANXIOUS: 0
NUMBNESS: 0
CONSTIPATION: 0
WEAKNESS: 0
ABDOMINAL PAIN: 0
DIARRHEA: 0
SHORTNESS OF BREATH: 0
EYE DISCHARGE: 0
FLANK PAIN: 0
DYSPHORIC MOOD: 0
UNEXPECTED WEIGHT CHANGE: 1
APPETITE CHANGE: 0
HEADACHES: 0
WHEEZING: 0
MYALGIAS: 0
BLOOD IN STOOL: 0
COUGH: 0
LIGHT-HEADEDNESS: 0
SORE THROAT: 0
SINUS PRESSURE: 0
JOINT SWELLING: 0
VOMITING: 0
DIZZINESS: 0
ARTHRALGIAS: 1
FEVER: 0
PALPITATIONS: 0
HEMATURIA: 0
EYE ITCHING: 0
RHINORRHEA: 0
SLEEP DISTURBANCE: 0
ACTIVITY CHANGE: 1
NAUSEA: 0
DYSURIA: 0

## 2024-06-11 ENCOUNTER — APPOINTMENT (OUTPATIENT)
Dept: PRIMARY CARE | Facility: CLINIC | Age: 54
End: 2024-06-11
Payer: COMMERCIAL

## 2024-07-30 ENCOUNTER — APPOINTMENT (OUTPATIENT)
Dept: SURGERY | Facility: CLINIC | Age: 54
End: 2024-07-30
Payer: COMMERCIAL

## 2024-07-30 VITALS — HEIGHT: 62 IN | WEIGHT: 236 LBS | BODY MASS INDEX: 43.43 KG/M2

## 2024-07-30 NOTE — PROGRESS NOTES
"Initial Bariatric Nutrition Assessment    Surgeon: Johana    Patient is considering: uncertain until she speaks with Dr. Vaughn mid August    ASSESSMENT:  Current weight:   Vitals:    07/30/24 1409   Weight: 107 kg (236 lb)    Ht:  1.562 m (5' 1.5\")  BMI: Body mass index is 43.87 kg/m².        Initial start weight in pounds: 236.0 (reported)  Pre-Op Excess Body Weight (EBW) in pounds: 102.0  Target Post-Op weight goal in pounds:  pending procedure    This is a 53 y.o. female with morbid obesity (Body mass index is 43.87 kg/m².) who presents to clinic for consideration of bariatric surgery. The patient has attempted and failed multiple diet and exercise regimens for weight loss.   Goal for surgery is to lower weight for overall health.      Food allergies/intolerances: no     Chewing/Swallowing/Dentition: no  Nausea / Vomiting / Hx Gastroparesis: acid reflux  Diarrhea/ Constipation: both  Smoking/Tobacco use: no  Vitamins/Minerals supplements: no  Hours of sleep/night: 5-7  Exercise: no, has fracture in right foot and ankle  Previous Weight loss Attempts: Keto, WW, RX meds., low carb    Medications:     Current Outpatient Medications:     albuterol 90 mcg/actuation inhaler, Inhale 2 puffs every 6 hours if needed., Disp: , Rfl:     ammonium lactate (Lac-Hydrin) 12 % lotion, Apply topically if needed for dry skin., Disp: 225 g, Rfl: 1    busPIRone (Buspar) 10 mg tablet, Take 1 tablet (10 mg) by mouth once daily at bedtime., Disp: 90 tablet, Rfl: 3    ibuprofen 200 mg tablet, Take 1 tablet (200 mg) by mouth every 6 hours if needed., Disp: , Rfl:     ipratropium-albuteroL (Duo-Neb) 0.5-2.5 mg/3 mL nebulizer solution, , Disp: , Rfl:     omeprazole (PriLOSEC) 40 mg DR capsule, TAKE 1 CAPSULE BY MOUTH ONCE DAILY. FOR GERD/GASTRITIS ISSUES, Disp: 90 capsule, Rfl: 1    Pulmicort Flexhaler 180 mcg/actuation inhaler, USE DAILY WITH SOB, Disp: 1 each, Rfl: 3    24 HOUR RECALL/DIET HISTORY:  Breakfast: cinnamon Chex with low " fat milk and 2 slices wheat toast with butter  Snack: no  Lunch: skipped  Snack: no  Dinner: 2 slices rye with corned beef, cheese and chips  Snack: grapes  Beverages: water, coffee with flavored creamer, diet pop, juice  Alcohol: twice per year or less    Person responsible for cooking & shopping? self  How often do you eat sweet snacks? daily   How often do you eat savory snacks? daily  How often do you eat out?  1-2x/wk  Do you feel overly stuffed? no  Binge Eating?  no  Night Eating?  no  Emotional Eating?  yes, stress and boredom     READINESS TO LEARN:  Motivation to learn: Interested        Understanding of instruction: Good  Anticipated Compliance: Good  Family Support: spouse, granddaughter       Educational Materials Provided:    Pre-op Diet and meal plan                                          Nutrition Guidelines for Gastric Bypass and Sleeve Gastrectomy   Schedules for MSWL class and support group   Goals sheet    Nutrition assessment completed today.  Pt will be scheduled for video education class to discuss the 2 week pre op diet, post op protein and fluid goals, vitamin and mineral supplementation, exercise goals, and post op diet progression closer to the time of surgery.    Instructed pt on a 1400 calorie meal plan and encouraged patient  to measure and record intake daily.  Advised to eat 3 meals and 1-2 high protein or produce based snacks daily.   Reviewed postop behaviors to start practicing.   Set a goal to start doing regular exercise.    Patient was receptive to nutritional recommendations, asked numerous questions, and verbalized understanding of the weight loss surgery diet.  Patient expressed understanding about the importance of strict dietary compliance post-surgery to avoid nutritional deficiencies and achieve optimal weight loss and verbalized intent to follow dietary recommendations.    Malnutrition Screening: n/a  Significant unintentional weight loss? n/a   Eating less than 75% of  usual intake for more than 2 weeks? n/a      Nutrition Diagnosis:   Overweight/obesity related to excess energy intake as evidenced by BMI >= 40 kg/m^2.  Food- and nutrition-related knowledge deficit related to lack of prior exposure to surgical weight loss information as evidenced by pt new to surgical program.    Nutrition Interventions:   Modify type and amount of food and nutrients within meals and snacks.  Comprehensive Nutrition Education    Recommendations:  1. Begin following your meal plan.  Measure and record intake daily.   2. Structure meal patterns, eating three meals and 1-2 snacks per day.  3. Aim for 15-30 grams protein per meal.  Have 1-2 high protein snacks that are 10-20 g protein each.  You can try a tuna or chicken packet, Greek yogurt, 2 string cheeses, Protein bars like Quest, Pure Protein, Premier, or Built Bars. you can also try protein chips form Quest or Atkins.    4. Drink 64oz of calorie-free, caffeine-free, and non-carbonated beverages.   5. Practice no drinking 30 minutes before meals, nothing with meals and wait 30 minutes after meals to drink again. Make meals last 30 minutes-chew thoroughly.   6. Limit or omit eating out/sweets/savory snacks to 1-2 times per week.  7. Begin daily multivitamin that is not a gummy multivitamin.  8. Increase physical activity by 10-15 minutes as tolerated to an end goal of 60 minutes 5 x per week. Consistency is the key.  Pre-op Goal weight: lose 5% of body weight    Nutrition Monitoring and Evaluation: 1-2 pound weight loss per week  Criteria: weight check  Need for Follow-up: in 1 month    Patient does meet [employer] Health guidelines for weight loss surgery, however needs to demonstrate consistent effort in making dietary changes before giving clearance. It is anticipated that the patient will need at least 1 nutritional follow-up visits prior to clearance for surgery.

## 2024-08-05 ENCOUNTER — APPOINTMENT (OUTPATIENT)
Dept: SURGERY | Facility: CLINIC | Age: 54
End: 2024-08-05
Payer: COMMERCIAL

## 2024-08-12 ENCOUNTER — APPOINTMENT (OUTPATIENT)
Dept: SURGERY | Facility: CLINIC | Age: 54
End: 2024-08-12
Payer: COMMERCIAL

## 2024-08-12 VITALS
SYSTOLIC BLOOD PRESSURE: 111 MMHG | DIASTOLIC BLOOD PRESSURE: 69 MMHG | WEIGHT: 230 LBS | HEIGHT: 62 IN | HEART RATE: 64 BPM | BODY MASS INDEX: 42.33 KG/M2

## 2024-08-12 DIAGNOSIS — Z98.84 BARIATRIC SURGERY STATUS: ICD-10-CM

## 2024-08-12 DIAGNOSIS — R73.9 HYPERGLYCEMIA: ICD-10-CM

## 2024-08-12 DIAGNOSIS — E66.01 CLASS 3 SEVERE OBESITY DUE TO EXCESS CALORIES WITH SERIOUS COMORBIDITY AND BODY MASS INDEX (BMI) OF 40.0 TO 44.9 IN ADULT (MULTI): ICD-10-CM

## 2024-08-12 DIAGNOSIS — Z01.818 PRE-OPERATIVE CLEARANCE: ICD-10-CM

## 2024-08-12 DIAGNOSIS — G47.33 OSA ON CPAP: ICD-10-CM

## 2024-08-12 PROCEDURE — 99205 OFFICE O/P NEW HI 60 MIN: CPT | Performed by: SURGERY

## 2024-08-12 PROCEDURE — 3008F BODY MASS INDEX DOCD: CPT | Performed by: SURGERY

## 2024-08-12 NOTE — PATIENT INSTRUCTIONS
PLAN:  The plan of treatment for Rivka Burgos is to continue with the consultations and tests ordered today in hopes of qualifying for pre-operative clearance for bariatric surgery. This includes:    Consult Nutrition for education and MSWL  Consult Psychology  Consult Physical Therapy for limited mobility  Consult cardiology  Consult pulmonology  Labs/CXR/EKG ordered  EGD  PCP for medical optimization  Consult sleep medicine - concern for LARA    The following are some lifestyle changes you should begin to prepare you for your by[ass surgery.   Eliminate soda and other carbonated beverages from your diet. Carbonation will not be well tolerated after surgery. Try Propel, Vitamin Water Zero, Sobe Lifewater, Crystal Light or water.    Increase fluid consumption to 64 oz daily. Do not drink within 30 minutes of eating as this will liquefy your food and make you hungry more quickly.    Exercise for 30-60 minutes daily. Brisk walking, bike riding and swimming are all examples of healthy exercise. If you are unable to exercise we recommend seated exercise.    Do not skip meals.    Take a multivitamin daily.    Lose weight. In preparation for your surgery it is important that you begin making healthier food choices now. Our dietitian will meet with you to help you select foods lower in calories and higher in nutrition. We would like you to lose at least 10  lbs prior to surgery.     Increase your protein intake to 60 grams per day.    Alcohol is empty calories. Please eliminate while preparing for surgery.    Plan your meals.      General Instruction: 1) Use the information we gave you today to work through your insurance requirements and medical clearances.   2) These documents need to get faxed to the program navigators so they can submit them for approval from your insurance company.   3) Obtain labs today at a  facility. We will call you with any abnormalities and corrections you need to make.   4) Continue to work  with your primary care doctor and other specialist so your other health problems are well controlled prior to your surgery.   5) Adopt the recommendations of the program dietician so you develop healthy eating patterns.   6) Work with the sleep team to get your sleep apnea treated to prevent other health problems .   7) Consider attending a support group to learn from other who have been through the process.   8) Come to the MSWL sessions.

## 2024-08-12 NOTE — PROGRESS NOTES
Subjective   Date: 8/12/2024 Time: 12:22 PM  Name: Rivka Burgos  MRN: [id number]  This is a 53 y.o. female with morbid obesity (with a BMI of 42.75)   who presents to clinic for consideration of bariatric surgery. she has attempted and failed multiple diet and exercise regimens for weight loss. Initial Onset of obesity was  3 Year(s) Ago. Patient noticed that she gets super bloated when she eats. Their goal for surgery is to  be healthier . The patient has tried multiple diets to lose weight including  diet medication . The patient was most successful with the  diet medication . The most pounds lost on this diet were 35 lbs. The patient considers their dietary weakness to be carbs The patient reports a  highest weight ever of 236 pounds and lowest weight ever of 150 pounds Distribution of Obesity: is central. Current diet:  1400 Calories Per Day. Compliance: Strict Adherence The patient does not exercise   Feels like she is choking all  of the time and hard to breathe as neck has gotten big. Wants to be helalthier. Stopped her buspar as was stressed and now this is better.  Uses her machine for olive and helps her a lot.   If misses omeprazole then will just keep having acid burps.     Comorbidities: back paintakes NSAIDS, esophageal reflux, high cholesterol, and sleep apnea using an appliance     PARTIAL HYSTERECTOMY 27 YEARS AGO    Yudith-en-Y Gastric Bypass      On PPI    How bad is the heartburn? 0 = No symptoms  Heartburn when lying down? 0 = No symptoms  Heartburn when standing up? 0 = No symptoms  Heartburn after meals? 0 = No symptoms  Does heartburn change your diet? 0 = No symptoms  Does heartburn wake you from sleep? 0 = No symptoms  Do you have difficulty swallowing? 0 = No symptoms  Do you have pain with swallowing? 0 = No symptoms  If you take medication, does this affect your daily life? 0 = No symptoms  How bad is the regurgitation? 0 = No symptoms  Regurgitation when lying down? 0 = No  symptoms  Regurgitation when standing up? 0 = No symptoms  Regurgitation after meals? 0 = No symptoms  Does regurgitation change your diet? 0 = No symptoms  Does regurgitation wake you from sleep? 0 = No symptoms  How satisfied are you with your present condition? Satisfied    PMH: No past medical history on file.     PSH:   Past Surgical History:   Procedure Laterality Date    BLADDER REPAIR      CERVIX SURGERY      COLONOSCOPY      HERNIA REPAIR      PARTIAL HYSTERECTOMY        Umbilical hernia and not 4sure if used mesh-done 6-7 years ago.     FAMILY HISTORY:  Family History   Problem Relation Name Age of Onset    Asthma Mother Julienne Tim     Hypertension Mother Julienne Tim     No Known Problems Father      Breast cancer Maternal Grandmother Gris Chakraborty     Heart disease Maternal Grandfather Julio Chakraborty     Diabetes Daughter Daya Russell     Diabetes Daughter Brenna Burgos      labor Sister Claudine Richards      labor Sister Tash Askew         SOCIAL HISTORY:  Social History     Tobacco Use    Smoking status: Former     Current packs/day: 0.00     Types: Cigarettes     Quit date: 2015     Years since quittin.6    Smokeless tobacco: Never   Vaping Use    Vaping status: Never Used   Substance Use Topics    Alcohol use: Not Currently    Drug use: Never       MEDICATIONS:  Prior to Admission Medications:  Medication Documentation Review Audit       Reviewed by Janeth Kaur MA (Medical Assistant) on 24 at 1119      Medication Order Taking? Sig Documenting Provider Last Dose Status   albuterol 90 mcg/actuation inhaler [id number]  Inhale 2 puffs every 6 hours if needed. Historical Provider, MD  Active   ammonium lactate (Lac-Hydrin) 12 % lotion [id number]  Apply topically if needed for dry skin. Cydney Moffett, DO  Active   busPIRone (Buspar) 10 mg tablet [id number] No Take 1 tablet (10 mg) by mouth once daily at bedtime.   Patient not taking: Reported on 2024     "Cydney Moffett DO Not Taking Active   ibuprofen 200 mg tablet [id number]  Take 1 tablet (200 mg) by mouth every 6 hours if needed. Historical Provider, MD  Active   ipratropium-albuteroL (Duo-Neb) 0.5-2.5 mg/3 mL nebulizer solution [id number]   Historical Provider, MD  Active   omeprazole (PriLOSEC) 40 mg DR capsule [id number]  TAKE 1 CAPSULE BY MOUTH ONCE DAILY. FOR GERD/GASTRITIS ISSUES Cydney L DO Zuhair  Active   Pulmicort Flexhaler 180 mcg/actuation inhaler [id number]  USE DAILY WITH SOB Cydney Moffett DO  Active                     ALLERGIES:  No Known Allergies    REVIEW OF SYSTEMS:  GENERAL: Negative for malaise, significant weight loss and fever  HEAD: Negative for headache, swelling.  NECK: Negative for lumps, goiter, pain and significant neck swelling  RESPIRATORY: Negative for cough, wheezing or shortness of breath.  CARDIOVASCULAR: Negative for chest pain, leg swelling or palpitations.  GI: Negative for abdominal discomfort, blood in stools or black stools or change in bowel habits  : No history of dysuria, frequency or incontinence  MUSCULOSKELETAL: Negative for joint pain or swelling, back pain or muscle pain.  SKIN: Negative for lesions, rash, and itching.  PSYCH: Negative for sleep disturbance, mood disorder and recent psychosocial stressors.  ENDOCRINE: Negative for cold or heat intolerance, polyuria, polydipsia and goiter.    Objective   PHYSICAL EXAM:  Visit Vitals  /69   Pulse 64   Ht 1.562 m (5' 1.5\")   Wt 104 kg (230 lb)   BMI 42.75 kg/m²   Smoking Status Former   BSA 2.12 m²   Bmi 42.7.  Lost 7 lbs since talking to the [facility].  She is using baritastic.   General appearance: obese, NAD  Neuro: AOx3  Head: EOMI; no swelling or lesions of scalp or face  ENT:  no lumps or lymphadenopathy, thyroid normal to palpation; oropharynx clear, no swelling or erythema  Skin: warm, no erythema or rashes  Lungs: clear to percussion and auscultation  Heart: regular rhythm and S1, S2 " normal  Abdomen: soft, non-tender, no masses, no organomegaly  Extremities: Normal exam of the extremities. No swelling or pain. Rle in a boot.  She fell after stepping off the bus and fractured her ankle.    Psych: no hurried speech, no flight of ideas, normal affect    IMPRESSION:  Rivka Burgos is a 53 y.o. female with a bmi of Body mass index is 42.75 kg/m². with the following diagnoses and co-morbidities: olive, leg pain. Both daughters are type 1 diabetics.  She prefers the bypass.  Ibuprofen only for the fracure and knows can't take nsaids.   Lifestyle changes reviewed and all questions answered.  Also needs a knee replacement on left.      We discussed the bypass  at [facility] and all questions were answered. risks and benefits were discussed  The patient understands the risks and benefits of the procedure and how the procedure is performed. The patient understands the risks include but are not limited to bleeding, infection, DVT, PE, pneumonia, myocardial infarction, leak along the staple lines, and weight regain. We discussed lifestyle changes necessary to be successful.     This patient does meet the criteria for a surgical weight loss procedure according to NIH guidelines.  The risks of sleeve gastrectomy, Yudith-en-Y gastric bypass, and duodenal switch surgery including bleeding, leak, wound infection, dehydration, ulcers, internal hernia, DVT/PE, prolonged nausea/vomiting, incomplete resolution of associated medical conditions, reflux, weight regain, vitamin/mineral deficiencies, and death have been explained to the patient and Rivka Burgos has expressed understanding and acceptance of them.     The increased risk of substance and alcohol abuse following bariatric surgery was discussed with the patient, along with the negative consequences of substance/alcohol use after surgery including addiction, worsening of mental health disorders, and injury to the stomach. The risk of smoking and vaping (tobacco or  any other substance) after bariatric surgery was explained to the patient. This includes risk of anastamotic ulcers, gastritis, bleeding, perforation, stricture, and PO intolerance.  The patient expressed understanding and acceptance of these risks.    The benefits of the above surgeries including weight loss, improvement/resolution of associated medical and mental health conditions, improved mobility, and decreased mortality have been explained the the patient and Rivka Burgos has expressed understanding and acceptance of them.      Assessment/Plan   PLAN:  The plan of treatment for Rivka Burgos is to continue with the consultations and tests ordered today in hopes of qualifying for pre-operative clearance for bariatric surgery. This includes:    Consult Nutrition for education and MSWL  Consult Psychology  Consult Physical Therapy for limited mobility  Consult cardiology  Consult pulmonology  Labs/CXR/EKG ordered  EGD  PCP for medical optimization  Consult sleep medicine - concern for LARA    The following are some lifestyle changes you should begin to prepare you for your by[ass surgery.   Eliminate soda and other carbonated beverages from your diet. Carbonation will not be well tolerated after surgery. Try Propel, Vitamin Water Zero, Sobe Lifewater, Crystal Light or water.    Increase fluid consumption to 64 oz daily. Do not drink within 30 minutes of eating as this will liquefy your food and make you hungry more quickly.    Exercise for 30-60 minutes daily. Brisk walking, bike riding and swimming are all examples of healthy exercise. If you are unable to exercise we recommend seated exercise.    Do not skip meals.    Take a multivitamin daily.    Lose weight. In preparation for your surgery it is important that you begin making healthier food choices now. Our dietitian will meet with you to help you select foods lower in calories and higher in nutrition. We would like you to lose at least 10  lbs prior to  surgery.     Increase your protein intake to 60 grams per day.    Alcohol is empty calories. Please eliminate while preparing for surgery.    Plan your meals.      General Instruction: 1) Use the information we gave you today to work through your insurance requirements and medical clearances.   2) These documents need to get faxed to the program navigators so they can submit them for approval from your insurance company.   3) Obtain labs today at a  facility. We will call you with any abnormalities and corrections you need to make.   4) Continue to work with your primary care doctor and other specialist so your other health problems are well controlled prior to your surgery.   5) Adopt the recommendations of the program dietician so you develop healthy eating patterns.   6) Work with the sleep team to get your sleep apnea treated to prevent other health problems .   7) Consider attending a support group to learn from other who have been through the process.   8) Come to the MSWL sessions.   45 minutes were spent with patient including history, physical exam, and education.

## 2024-08-14 ENCOUNTER — DOCUMENTATION (OUTPATIENT)
Dept: SURGERY | Facility: CLINIC | Age: 54
End: 2024-08-14
Payer: COMMERCIAL

## 2024-08-22 ENCOUNTER — DOCUMENTATION (OUTPATIENT)
Dept: SURGERY | Facility: CLINIC | Age: 54
End: 2024-08-22
Payer: COMMERCIAL

## 2024-08-27 ENCOUNTER — APPOINTMENT (OUTPATIENT)
Dept: SURGERY | Facility: CLINIC | Age: 54
End: 2024-08-27
Payer: COMMERCIAL

## 2025-05-09 DIAGNOSIS — K21.00 GASTRO-ESOPHAGEAL REFLUX DISEASE WITH ESOPHAGITIS, WITHOUT BLEEDING: ICD-10-CM

## 2025-05-09 RX ORDER — OMEPRAZOLE 40 MG/1
CAPSULE, DELAYED RELEASE ORAL
Qty: 90 CAPSULE | Refills: 1 | OUTPATIENT
Start: 2025-05-09